# Patient Record
Sex: FEMALE | Race: OTHER | HISPANIC OR LATINO | URBAN - METROPOLITAN AREA
[De-identification: names, ages, dates, MRNs, and addresses within clinical notes are randomized per-mention and may not be internally consistent; named-entity substitution may affect disease eponyms.]

---

## 2018-05-29 PROBLEM — I10 BENIGN ESSENTIAL HYPERTENSION: Noted: 2018-05-29

## 2020-08-20 ENCOUNTER — PREPPED CHART (OUTPATIENT)
Dept: URBAN - METROPOLITAN AREA CLINIC 47 | Facility: CLINIC | Age: 65
End: 2020-08-20

## 2021-12-13 ENCOUNTER — ESTABLISHED COMPREHENSIVE EXAM (OUTPATIENT)
Dept: URBAN - METROPOLITAN AREA CLINIC 47 | Facility: CLINIC | Age: 66
End: 2021-12-13

## 2021-12-13 DIAGNOSIS — E11.9: ICD-10-CM

## 2021-12-13 DIAGNOSIS — H04.123: ICD-10-CM

## 2021-12-13 DIAGNOSIS — H25.13: ICD-10-CM

## 2021-12-13 DIAGNOSIS — H52.4: ICD-10-CM

## 2021-12-13 PROCEDURE — 92015 DETERMINE REFRACTIVE STATE: CPT

## 2021-12-13 PROCEDURE — 92014 COMPRE OPH EXAM EST PT 1/>: CPT

## 2021-12-13 ASSESSMENT — KERATOMETRY
OS_K2POWER_DIOPTERS: 41.25
OD_AXISANGLE_DEGREES: 180
OS_AXISANGLE_DEGREES: 60
OS_AXISANGLE2_DEGREES: 150
OD_K1POWER_DIOPTERS: 41.50
OS_K1POWER_DIOPTERS: 40.75
OD_K2POWER_DIOPTERS: 41.50
OD_AXISANGLE2_DEGREES: 090

## 2021-12-13 ASSESSMENT — VISUAL ACUITY
OS_SC: 20/30-2
OD_SC: 20/30-1

## 2023-02-07 ENCOUNTER — ESTABLISHED COMPREHENSIVE EXAM (OUTPATIENT)
Dept: URBAN - METROPOLITAN AREA CLINIC 47 | Facility: CLINIC | Age: 68
End: 2023-02-07

## 2023-02-07 DIAGNOSIS — H25.13: ICD-10-CM

## 2023-02-07 DIAGNOSIS — H02.834: ICD-10-CM

## 2023-02-07 DIAGNOSIS — E11.9: ICD-10-CM

## 2023-02-07 DIAGNOSIS — H02.831: ICD-10-CM

## 2023-02-07 DIAGNOSIS — H04.123: ICD-10-CM

## 2023-02-07 PROCEDURE — 99214 OFFICE O/P EST MOD 30 MIN: CPT

## 2023-02-07 ASSESSMENT — KERATOMETRY
OS_K1POWER_DIOPTERS: 40.75
OD_K2POWER_DIOPTERS: 41.50
OS_AXISANGLE_DEGREES: 163
OS_AXISANGLE2_DEGREES: 73
OD_AXISANGLE2_DEGREES: 117
OS_K1POWER_DIOPTERS: 41.25
OD_AXISANGLE_DEGREES: 27
OS_K2POWER_DIOPTERS: 41.25
OD_AXISANGLE2_DEGREES: 090
OD_K1POWER_DIOPTERS: 41.50
OS_K2POWER_DIOPTERS: 41.75
OS_AXISANGLE_DEGREES: 60
OS_AXISANGLE2_DEGREES: 150
OD_AXISANGLE_DEGREES: 180
OD_K2POWER_DIOPTERS: 42.25

## 2023-02-07 ASSESSMENT — VISUAL ACUITY
OD_SC: 20/40
OD_PH: 20/30+1
OS_SC: 20/25-1
OU_SC: 20/40

## 2023-02-07 ASSESSMENT — TONOMETRY
OD_IOP_MMHG: 15
OS_IOP_MMHG: 15

## 2023-06-15 ENCOUNTER — DIAGNOSTICS ONLY (OUTPATIENT)
Dept: URBAN - METROPOLITAN AREA CLINIC 47 | Facility: CLINIC | Age: 68
End: 2023-06-15

## 2023-06-15 DIAGNOSIS — H02.831: ICD-10-CM

## 2023-06-15 DIAGNOSIS — H02.834: ICD-10-CM

## 2023-06-15 PROCEDURE — 92285 EXTERNAL OCULAR PHOTOGRAPHY: CPT

## 2023-06-15 PROCEDURE — 92083 EXTENDED VISUAL FIELD XM: CPT

## 2023-06-15 ASSESSMENT — KERATOMETRY
OD_AXISANGLE2_DEGREES: 117
OS_AXISANGLE2_DEGREES: 73
OS_K1POWER_DIOPTERS: 41.25
OD_K1POWER_DIOPTERS: 41.50
OD_AXISANGLE2_DEGREES: 090
OD_K2POWER_DIOPTERS: 41.50
OD_AXISANGLE_DEGREES: 180
OS_AXISANGLE_DEGREES: 163
OS_AXISANGLE2_DEGREES: 150
OS_K2POWER_DIOPTERS: 41.75
OD_K2POWER_DIOPTERS: 42.25
OD_AXISANGLE_DEGREES: 27
OS_K1POWER_DIOPTERS: 40.75
OS_AXISANGLE_DEGREES: 60
OS_K2POWER_DIOPTERS: 41.25

## 2023-07-03 ENCOUNTER — CONSULTATION (OUTPATIENT)
Dept: URBAN - METROPOLITAN AREA CLINIC 47 | Facility: CLINIC | Age: 68
End: 2023-07-03

## 2023-07-03 DIAGNOSIS — H02.834: ICD-10-CM

## 2023-07-03 DIAGNOSIS — H02.423: ICD-10-CM

## 2023-07-03 DIAGNOSIS — H02.831: ICD-10-CM

## 2023-07-03 PROCEDURE — 99215 OFFICE O/P EST HI 40 MIN: CPT

## 2023-07-03 ASSESSMENT — TONOMETRY
OD_IOP_MMHG: 16.0
OS_IOP_MMHG: 18.0

## 2023-07-03 ASSESSMENT — VISUAL ACUITY
OD_SC: 20/40
OU_SC: J3-2
OS_SC: 20/40

## 2024-07-28 ENCOUNTER — HOSPITAL ENCOUNTER (EMERGENCY)
Facility: HOSPITAL | Age: 69
Discharge: HOME | End: 2024-07-28
Attending: EMERGENCY MEDICINE
Payer: COMMERCIAL

## 2024-07-28 ENCOUNTER — APPOINTMENT (EMERGENCY)
Dept: CT IMAGING | Facility: HOSPITAL | Age: 69
End: 2024-07-28
Payer: COMMERCIAL

## 2024-07-28 VITALS
HEIGHT: 61 IN | HEART RATE: 86 BPM | RESPIRATION RATE: 20 BRPM | SYSTOLIC BLOOD PRESSURE: 128 MMHG | DIASTOLIC BLOOD PRESSURE: 60 MMHG | WEIGHT: 145 LBS | OXYGEN SATURATION: 97 % | TEMPERATURE: 97.7 F | BODY MASS INDEX: 27.38 KG/M2

## 2024-07-28 DIAGNOSIS — R93.89 ABNORMAL FINDING ON CT SCAN: ICD-10-CM

## 2024-07-28 DIAGNOSIS — R10.9 ABDOMINAL PAIN: Primary | ICD-10-CM

## 2024-07-28 LAB
ALBUMIN SERPL BCG-MCNC: 4.5 G/DL (ref 3.5–5)
ALP SERPL-CCNC: 52 U/L (ref 34–104)
ALT SERPL W P-5'-P-CCNC: 36 U/L (ref 7–52)
ANION GAP SERPL CALCULATED.3IONS-SCNC: 8 MMOL/L (ref 4–13)
AST SERPL W P-5'-P-CCNC: 33 U/L (ref 13–39)
ATRIAL RATE: 86 BPM
BASOPHILS # BLD AUTO: 0.05 THOUSANDS/ÂΜL (ref 0–0.1)
BASOPHILS NFR BLD AUTO: 1 % (ref 0–1)
BILIRUB SERPL-MCNC: 0.42 MG/DL (ref 0.2–1)
BUN SERPL-MCNC: 18 MG/DL (ref 5–25)
CALCIUM SERPL-MCNC: 9.6 MG/DL (ref 8.4–10.2)
CHLORIDE SERPL-SCNC: 103 MMOL/L (ref 96–108)
CO2 SERPL-SCNC: 26 MMOL/L (ref 21–32)
CREAT SERPL-MCNC: 0.64 MG/DL (ref 0.6–1.3)
EOSINOPHIL # BLD AUTO: 0.31 THOUSAND/ÂΜL (ref 0–0.61)
EOSINOPHIL NFR BLD AUTO: 3 % (ref 0–6)
ERYTHROCYTE [DISTWIDTH] IN BLOOD BY AUTOMATED COUNT: 13 % (ref 11.6–15.1)
GFR SERPL CREATININE-BSD FRML MDRD: 92 ML/MIN/1.73SQ M
GLUCOSE SERPL-MCNC: 130 MG/DL (ref 65–140)
HCT VFR BLD AUTO: 38.5 % (ref 34.8–46.1)
HGB BLD-MCNC: 13.1 G/DL (ref 11.5–15.4)
IMM GRANULOCYTES # BLD AUTO: 0.03 THOUSAND/UL (ref 0–0.2)
IMM GRANULOCYTES NFR BLD AUTO: 0 % (ref 0–2)
LIPASE SERPL-CCNC: 17 U/L (ref 11–82)
LYMPHOCYTES # BLD AUTO: 3.15 THOUSANDS/ÂΜL (ref 0.6–4.47)
LYMPHOCYTES NFR BLD AUTO: 31 % (ref 14–44)
MCH RBC QN AUTO: 32.3 PG (ref 26.8–34.3)
MCHC RBC AUTO-ENTMCNC: 34 G/DL (ref 31.4–37.4)
MCV RBC AUTO: 95 FL (ref 82–98)
MONOCYTES # BLD AUTO: 1 THOUSAND/ÂΜL (ref 0.17–1.22)
MONOCYTES NFR BLD AUTO: 10 % (ref 4–12)
NEUTROPHILS # BLD AUTO: 5.54 THOUSANDS/ÂΜL (ref 1.85–7.62)
NEUTS SEG NFR BLD AUTO: 55 % (ref 43–75)
NRBC BLD AUTO-RTO: 0 /100 WBCS
P AXIS: 72 DEGREES
PLATELET # BLD AUTO: 233 THOUSANDS/UL (ref 149–390)
PMV BLD AUTO: 9.2 FL (ref 8.9–12.7)
POTASSIUM SERPL-SCNC: 4.4 MMOL/L (ref 3.5–5.3)
PR INTERVAL: 128 MS
PROT SERPL-MCNC: 7.2 G/DL (ref 6.4–8.4)
QRS AXIS: 50 DEGREES
QRSD INTERVAL: 78 MS
QT INTERVAL: 374 MS
QTC INTERVAL: 447 MS
RBC # BLD AUTO: 4.05 MILLION/UL (ref 3.81–5.12)
SODIUM SERPL-SCNC: 137 MMOL/L (ref 135–147)
T WAVE AXIS: 52 DEGREES
VENTRICULAR RATE: 86 BPM
WBC # BLD AUTO: 10.08 THOUSAND/UL (ref 4.31–10.16)

## 2024-07-28 PROCEDURE — 36415 COLL VENOUS BLD VENIPUNCTURE: CPT

## 2024-07-28 PROCEDURE — 96361 HYDRATE IV INFUSION ADD-ON: CPT

## 2024-07-28 PROCEDURE — 93010 ELECTROCARDIOGRAM REPORT: CPT | Performed by: INTERNAL MEDICINE

## 2024-07-28 PROCEDURE — 85025 COMPLETE CBC W/AUTO DIFF WBC: CPT

## 2024-07-28 PROCEDURE — 74176 CT ABD & PELVIS W/O CONTRAST: CPT

## 2024-07-28 PROCEDURE — 93005 ELECTROCARDIOGRAM TRACING: CPT

## 2024-07-28 PROCEDURE — 96374 THER/PROPH/DIAG INJ IV PUSH: CPT

## 2024-07-28 PROCEDURE — 83690 ASSAY OF LIPASE: CPT

## 2024-07-28 PROCEDURE — 80053 COMPREHEN METABOLIC PANEL: CPT

## 2024-07-28 PROCEDURE — 96375 TX/PRO/DX INJ NEW DRUG ADDON: CPT

## 2024-07-28 PROCEDURE — 99284 EMERGENCY DEPT VISIT MOD MDM: CPT

## 2024-07-28 PROCEDURE — 99285 EMERGENCY DEPT VISIT HI MDM: CPT

## 2024-07-28 RX ORDER — FAMOTIDINE 20 MG/1
20 TABLET, FILM COATED ORAL 2 TIMES DAILY
Qty: 30 TABLET | Refills: 0 | Status: SHIPPED | OUTPATIENT
Start: 2024-07-28

## 2024-07-28 RX ORDER — MAGNESIUM HYDROXIDE/ALUMINUM HYDROXICE/SIMETHICONE 120; 1200; 1200 MG/30ML; MG/30ML; MG/30ML
30 SUSPENSION ORAL ONCE
Status: COMPLETED | OUTPATIENT
Start: 2024-07-28 | End: 2024-07-28

## 2024-07-28 RX ORDER — FAMOTIDINE 10 MG/ML
20 INJECTION, SOLUTION INTRAVENOUS ONCE
Status: COMPLETED | OUTPATIENT
Start: 2024-07-28 | End: 2024-07-28

## 2024-07-28 RX ORDER — HYDROMORPHONE HCL/PF 1 MG/ML
0.5 SYRINGE (ML) INJECTION ONCE
Status: COMPLETED | OUTPATIENT
Start: 2024-07-28 | End: 2024-07-28

## 2024-07-28 RX ORDER — ONDANSETRON 2 MG/ML
4 INJECTION INTRAMUSCULAR; INTRAVENOUS ONCE
Status: COMPLETED | OUTPATIENT
Start: 2024-07-28 | End: 2024-07-28

## 2024-07-28 RX ADMIN — HYDROMORPHONE HYDROCHLORIDE 0.5 MG: 1 INJECTION, SOLUTION INTRAMUSCULAR; INTRAVENOUS; SUBCUTANEOUS at 04:00

## 2024-07-28 RX ADMIN — ONDANSETRON 4 MG: 2 INJECTION INTRAMUSCULAR; INTRAVENOUS at 04:21

## 2024-07-28 RX ADMIN — FAMOTIDINE 20 MG: 10 INJECTION, SOLUTION INTRAVENOUS at 04:21

## 2024-07-28 RX ADMIN — ALUMINUM HYDROXIDE, MAGNESIUM HYDROXIDE, DIMETHICONE 30 ML: 400; 400; 40 SUSPENSION ORAL at 04:22

## 2024-07-28 RX ADMIN — SODIUM CHLORIDE 1000 ML: 0.9 INJECTION, SOLUTION INTRAVENOUS at 04:00

## 2024-07-28 NOTE — ED NOTES
Pt is discharged home. Verbal/written instructions were given. No concerns voiced at this time. Gait is steady, Cab is arranged for pt     Raquel Hidalgo RN  07/28/24 0606

## 2024-07-28 NOTE — ED PROVIDER NOTES
"History  Chief Complaint   Patient presents with    Abdominal Pain     Pt brought in to ED c/o abd pain, and diarrhea \"for weeks\". Pt speaks mostly Mongolian . Right arm precaution due to mastectomy     68-year-old female presents to ED for evaluation of epigastric pain.  Patient states that this evening she started experiencing severe epigastric pain.  Has been having diarrhea for the past couple of weeks.  The epigastric pain is new for patient.  Has been seen at a [place] emergency department for recent symptoms per patient.  Documentation of this visit unavailable currently.  Patient has not taken any medication for the epigastric pain.  No relation of food and epigastric pain.  Denies fever, chills, shortness of breath, chest pain, seizure, syncope, dysuria, hematuria, increased urinary frequency.  Patient does have history of cholecystectomy, appendectomy several years ago.         None       No past medical history on file.    No past surgical history on file.    No family history on file.  I have reviewed and agree with the history as documented.    No existing history information found.  No existing history information found.       Review of Systems   Gastrointestinal:  Positive for abdominal pain and diarrhea.   All other systems reviewed and are negative.      Physical Exam  Physical Exam  Vitals and nursing note reviewed.   Constitutional:       General: She is not in acute distress.     Appearance: She is well-developed. She is ill-appearing. She is not toxic-appearing or diaphoretic.      Comments: Patient yelling out in pain.  Writhing around in ED bed.  Large amounts of distress.   HENT:      Head: Normocephalic and atraumatic.   Eyes:      Conjunctiva/sclera: Conjunctivae normal.   Cardiovascular:      Rate and Rhythm: Normal rate and regular rhythm.      Heart sounds: Normal heart sounds. No murmur heard.     No friction rub. No gallop.   Pulmonary:      Effort: Pulmonary effort is normal. No " respiratory distress.      Breath sounds: Normal breath sounds. No stridor. No wheezing, rhonchi or rales.   Abdominal:      Palpations: Abdomen is soft.      Tenderness: There is abdominal tenderness in the epigastric area.      Hernia: No hernia is present.   Musculoskeletal:         General: No swelling.      Cervical back: Neck supple.   Skin:     General: Skin is warm and dry.      Capillary Refill: Capillary refill takes less than 2 seconds.   Neurological:      Mental Status: She is alert.   Psychiatric:         Mood and Affect: Mood normal.         Vital Signs  ED Triage Vitals [07/28/24 0340]   Temperature Pulse Respirations Blood Pressure SpO2   97.7 °F (36.5 °C) 82 18 149/89 99 %      Temp Source Heart Rate Source Patient Position - Orthostatic VS BP Location FiO2 (%)   Oral Monitor -- Left arm --      Pain Score       10 - Worst Possible Pain           Vitals:    07/28/24 0340 07/28/24 0430   BP: 149/89 128/60   Pulse: 82 86         Visual Acuity      ED Medications  Medications   HYDROmorphone (DILAUDID) injection 0.5 mg (0.5 mg Intravenous Given 7/28/24 0400)   sodium chloride 0.9 % bolus 1,000 mL (0 mL Intravenous Stopped 7/28/24 0538)   Famotidine (PF) (PEPCID) injection 20 mg (20 mg Intravenous Given 7/28/24 0421)   aluminum-magnesium hydroxide-simethicone (MAALOX) oral suspension 30 mL (30 mL Oral Given 7/28/24 0422)   ondansetron (ZOFRAN) injection 4 mg (4 mg Intravenous Given 7/28/24 0421)       Diagnostic Studies  Results Reviewed       Procedure Component Value Units Date/Time    Lipase [[id number]]  (Normal) Collected: 07/28/24 0400    Lab Status: Final result Specimen: Blood from Arm, Left Updated: 07/28/24 0423     Lipase 17 u/L     Comprehensive metabolic panel [[id number]] Collected: 07/28/24 0400    Lab Status: Final result Specimen: Blood from Arm, Left Updated: 07/28/24 0423     Sodium 137 mmol/L      Potassium 4.4 mmol/L      Chloride 103 mmol/L      CO2 26 mmol/L      ANION GAP 8 mmol/L       BUN 18 mg/dL      Creatinine 0.64 mg/dL      Glucose 130 mg/dL      Calcium 9.6 mg/dL      AST 33 U/L      ALT 36 U/L      Alkaline Phosphatase 52 U/L      Total Protein 7.2 g/dL      Albumin 4.5 g/dL      Total Bilirubin 0.42 mg/dL      eGFR 92 ml/min/1.73sq m     Narrative:      National Kidney Disease Foundation guidelines for Chronic Kidney Disease (CKD):     Stage 1 with normal or high GFR (GFR > 90 mL/min/1.73 square meters)    Stage 2 Mild CKD (GFR = 60-89 mL/min/1.73 square meters)    Stage 3A Moderate CKD (GFR = 45-59 mL/min/1.73 square meters)    Stage 3B Moderate CKD (GFR = 30-44 mL/min/1.73 square meters)    Stage 4 Severe CKD (GFR = 15-29 mL/min/1.73 square meters)    Stage 5 End Stage CKD (GFR <15 mL/min/1.73 square meters)  Note: GFR calculation is accurate only with a steady state creatinine    CBC and differential [[id number]] Collected: 07/28/24 0400    Lab Status: Final result Specimen: Blood from Arm, Left Updated: 07/28/24 0406     WBC 10.08 Thousand/uL      RBC 4.05 Million/uL      Hemoglobin 13.1 g/dL      Hematocrit 38.5 %      MCV 95 fL      MCH 32.3 pg      MCHC 34.0 g/dL      RDW 13.0 %      MPV 9.2 fL      Platelets 233 Thousands/uL      nRBC 0 /100 WBCs      Segmented % 55 %      Immature Grans % 0 %      Lymphocytes % 31 %      Monocytes % 10 %      Eosinophils Relative 3 %      Basophils Relative 1 %      Absolute Neutrophils 5.54 Thousands/µL      Absolute Immature Grans 0.03 Thousand/uL      Absolute Lymphocytes 3.15 Thousands/µL      Absolute Monocytes 1.00 Thousand/µL      Eosinophils Absolute 0.31 Thousand/µL      Basophils Absolute 0.05 Thousands/µL                    CT abdomen pelvis wo contrast   Final Result by Yves Mendiola DO (07/28 0440)      No acute findings in the abdomen or pelvis within the limits of unenhanced technique.      There is a 1.6 cm left adrenal nodule. Although its imaging features are indeterminate, it does not have suspicious imaging features  (heterogeneity, necrosis, irregular margins), therefore this is likely benign, and can be followed by non-contrast    abdomen CT or MRI in 12 months. Please note that for adrenal nodule > 1 cm,  biochemical evaluation is suggested to rule out functioning adenoma, if not already performed.   Adrenal recommendation based on institutional consensus and Journal of American College of Radiology 2017;14:2421-8639.         The study was marked in EPIC for immediate notification.      Workstation performed: [id number]                    Procedures  ECG 12 Lead Documentation Only    Date/Time: 7/28/2024 3:45 AM    Performed by: Adi Patterson PA-C  Authorized by: Adi Patterson PA-C    Indications / Diagnosis:  Epigastric pain  Patient location:  ED  Previous ECG:     Previous ECG:  Unavailable  Interpretation:     Interpretation: normal    Rate:     ECG rate:  86    ECG rate assessment: normal    Rhythm:     Rhythm: sinus rhythm    Ectopy:     Ectopy: none    QRS:     QRS axis:  Normal    QRS intervals:  Normal  Conduction:     Conduction: normal    ST segments:     ST segments:  Normal  T waves:     T waves: normal             ED Course                                 SBIRT 22yo+      Flowsheet Row Most Recent Value   Initial Alcohol Screen: US AUDIT-C     1. How often do you have a drink containing alcohol? 0 Filed at: 07/28/2024 0346   2. How many drinks containing alcohol do you have on a typical day you are drinking?  0 Filed at: 07/28/2024 0346   3b. FEMALE Any Age, or MALE 65+: How often do you have 4 or more drinks on one occassion? 0 Filed at: 07/28/2024 0346   Audit-C Score 0 Filed at: 07/28/2024 0346   MARA: How many times in the past year have you...    Used an illegal drug or used a prescription medication for non-medical reasons? Never Filed at: 07/28/2024 0346                      Medical Decision Making  68-year-old female presents to ED for evaluation of epigastric pain, diarrhea as above.  On  physical examination patient vital signs stable.  Afebrile.  Nontachycardic.  Normotensive.  Patient in large amounts of distress.  Yelling in pain.  No murmur.  Normal breath sounds.  Nontoxic-appearing.  Tender to palpation of epigastric region.  No overlying skin changes of abdomen.  Obtaining workup consisting of CBC, CMP, lipase, CT abdomen pelvis without contrast.  Contrast not used as patient reports contrast allergy.  Dilaudid for pain control.  IV fluids.  Differential diagnosis includes but not limited to pancreatitis, acid reflux, viral GI infection, diverticulitis     CBC returned WNL.  CMP WNL.  Lipase WNL.  EKG without evidence of acute cardiac injury, arrhythmia.  CT abdomen pelvis without contrast returned demonstrating no acute findings of the abdomen.  Of note there is a 1.6 cm left adrenal nodule.  Radiology recommends follow-up imaging in a year for monitoring.  Incidental finding note written.     Patient's pain improved with medications.  Given results of workup, feel patient stable for discharge.  Plan to provide prescription for Pepcid for the abdominal pain.  Advised patient to call her family doctor and make an appointment for this week to discuss her symptoms.  Patient agreeable.  Patient discharged.    Prior to discharge, discharge instructions were discussed with patient at bedside. Patient was provided both verbal and written instructions. Patient is understanding of the discharge instructions and is agreeable to plan of care. Return precautions were discussed with patient bedside, patient verbalized understanding of signs and symptoms that would necessitate return to the ED. All questions were answered. Patient was comfortable with the plan of care and discharged to home.     Amount and/or Complexity of Data Reviewed  Labs: ordered.  Radiology: ordered.    Risk  OTC drugs.  Prescription drug management.                 Disposition  Final diagnoses:   Abdominal pain   Abnormal finding  on CT scan     Time reflects when diagnosis was documented in both MDM as applicable and the Disposition within this note       Time User Action Codes Description Comment    2024  5:32 AM Adi Patterson [R10.9] Abdominal pain     2024  5:33 AM Adi Patterson [R93.89] Abnormal finding on CT scan           ED Disposition       ED Disposition   Discharge    Condition   Stable    Date/Time   Sun 2024 0532    Comment   Neyda Maradiaga discharge to home/self care.                   Follow-up Information       Follow up With Specialties Details Why Contact Info    Sara Shearer MD Family Medicine   [address] [facility] [id number] [phone]              Patient's Medications   Discharge Prescriptions    FAMOTIDINE (PEPCID) 20 MG TABLET    Take 1 tablet (20 mg total) by mouth 2 (two) times a day       Start Date: 2024 End Date: --       Order Dose: 20 mg       Quantity: 30 tablet    Refills: 0       No discharge procedures on file.    PDMP Review       None            ED Provider  Electronically Signed by             Adi Patterson PA-C  24 0545

## 2024-07-28 NOTE — DISCHARGE INSTRUCTIONS
Today I provided prescription for Pepcid.  Take as directed for treatment of abdominal pain.  Follow-up with your family doctor this coming week to discuss symptoms.  Return to ED for new or worsening symptoms.

## 2024-07-28 NOTE — INCIDENTAL FINDINGS
The following findings require follow up:  Radiographic finding   Findin.6 cm left adrenal nodule   Follow up required: Noncon abdomen CT or MRI in 12 months.   Follow up should be done within 12 months     Please notify the following clinician to assist with the follow up:   Dr. Shearer    Incidental finding results were discussed with the Patient by Adi Patterson PA-C on 24.   They expressed understanding and all questions answered.

## 2025-04-03 ENCOUNTER — DIAGNOSTICS ONLY (OUTPATIENT)
Dept: URBAN - METROPOLITAN AREA CLINIC 47 | Facility: CLINIC | Age: 70
End: 2025-04-03

## 2025-04-03 DIAGNOSIS — H02.834: ICD-10-CM

## 2025-04-03 DIAGNOSIS — H02.423: ICD-10-CM

## 2025-04-03 DIAGNOSIS — H02.831: ICD-10-CM

## 2025-04-03 PROCEDURE — 92285 EXTERNAL OCULAR PHOTOGRAPHY: CPT

## 2025-04-03 PROCEDURE — 92082 INTERMEDIATE VISUAL FIELD XM: CPT

## 2025-04-24 ENCOUNTER — CONSULT EP (OUTPATIENT)
Dept: URBAN - METROPOLITAN AREA CLINIC 47 | Facility: CLINIC | Age: 70
End: 2025-04-24

## 2025-04-24 DIAGNOSIS — H02.423: ICD-10-CM

## 2025-04-24 DIAGNOSIS — H02.834: ICD-10-CM

## 2025-04-24 DIAGNOSIS — H02.831: ICD-10-CM

## 2025-04-24 PROCEDURE — 99214 OFFICE O/P EST MOD 30 MIN: CPT

## 2025-04-24 ASSESSMENT — TONOMETRY
OD_IOP_MMHG: 14.0
OS_IOP_MMHG: 16.0

## 2025-04-24 ASSESSMENT — VISUAL ACUITY
OD_PH: 20/25
OS_PH: 20/25
OS_SC: 20/40-2
OD_SC: 20/40-2

## 2025-05-01 ENCOUNTER — ESTABLISHED COMPREHENSIVE EXAM (OUTPATIENT)
Dept: URBAN - METROPOLITAN AREA CLINIC 47 | Facility: CLINIC | Age: 70
End: 2025-05-01

## 2025-05-01 DIAGNOSIS — H25.13: ICD-10-CM

## 2025-05-01 DIAGNOSIS — E11.9: ICD-10-CM

## 2025-05-01 DIAGNOSIS — H04.123: ICD-10-CM

## 2025-05-01 PROCEDURE — 99214 OFFICE O/P EST MOD 30 MIN: CPT

## 2025-05-01 ASSESSMENT — VISUAL ACUITY
OD_SC: 20/30-1
OS_SC: 20/30-2

## 2025-05-01 ASSESSMENT — TONOMETRY
OD_IOP_MMHG: 15
OS_IOP_MMHG: 16

## 2025-05-01 ASSESSMENT — KERATOMETRY
OD_K1POWER_DIOPTERS: 41.25
OS_AXISANGLE2_DEGREES: 50
OS_K2POWER_DIOPTERS: 42.25
OD_AXISANGLE2_DEGREES: 130
OD_AXISANGLE_DEGREES: 40
OS_K1POWER_DIOPTERS: 41.00
OS_AXISANGLE_DEGREES: 140
OD_K2POWER_DIOPTERS: 42.50